# Patient Record
Sex: MALE | Race: WHITE | Employment: UNEMPLOYED | URBAN - METROPOLITAN AREA
[De-identification: names, ages, dates, MRNs, and addresses within clinical notes are randomized per-mention and may not be internally consistent; named-entity substitution may affect disease eponyms.]

---

## 2017-01-16 ENCOUNTER — HOSPITAL ENCOUNTER (OUTPATIENT)
Dept: GENERAL RADIOLOGY | Age: 37
Discharge: HOME | End: 2017-01-16
Attending: INTERNAL MEDICINE
Payer: COMMERCIAL

## 2017-01-16 ENCOUNTER — TELEPHONE (OUTPATIENT)
Dept: INTERNAL MEDICINE CLINIC | Facility: CLINIC | Age: 37
End: 2017-01-16

## 2017-01-16 ENCOUNTER — OFFICE VISIT (OUTPATIENT)
Dept: INTERNAL MEDICINE CLINIC | Facility: CLINIC | Age: 37
End: 2017-01-16

## 2017-01-16 VITALS
HEART RATE: 70 BPM | BODY MASS INDEX: 29 KG/M2 | TEMPERATURE: 99 F | OXYGEN SATURATION: 98 % | WEIGHT: 229.25 LBS | SYSTOLIC BLOOD PRESSURE: 108 MMHG | DIASTOLIC BLOOD PRESSURE: 64 MMHG

## 2017-01-16 DIAGNOSIS — R21 FACIAL RASH: ICD-10-CM

## 2017-01-16 DIAGNOSIS — H93.90 LESION OF EAR: ICD-10-CM

## 2017-01-16 DIAGNOSIS — R07.89 RIGHT-SIDED CHEST WALL PAIN: Primary | ICD-10-CM

## 2017-01-16 DIAGNOSIS — K13.0 LESION OF LIP: ICD-10-CM

## 2017-01-16 DIAGNOSIS — R07.89 RIGHT-SIDED CHEST WALL PAIN: ICD-10-CM

## 2017-01-16 PROCEDURE — 99214 OFFICE O/P EST MOD 30 MIN: CPT | Performed by: INTERNAL MEDICINE

## 2017-01-16 PROCEDURE — 71101 X-RAY EXAM UNILAT RIBS/CHEST: CPT

## 2017-01-16 RX ORDER — TRAMADOL HYDROCHLORIDE 50 MG/1
50 TABLET ORAL EVERY 6 HOURS PRN
Qty: 30 TABLET | Refills: 1 | Status: SHIPPED | OUTPATIENT
Start: 2017-01-16 | End: 2018-02-01 | Stop reason: ALTCHOICE

## 2017-01-16 NOTE — PATIENT INSTRUCTIONS
For your rib/chest wall pain:  - We will check an x-ray of your ribs  - I have prescribed pain medication (Tramadol). Take 1 tablet every six hours as needed.   It can make you drowsy, so be careful when driving/operating machinery  - We will contact you monique

## 2017-01-16 NOTE — PROGRESS NOTES
Selin Lopez is a 43year old male. HPI:   Patient presents with:  Pain: Right side rib pain from fall 1 week ago. Dryness: behind left ear over 1 month. Dryness left corner of lip. Patient presents with right-sided chest wall pain.   He was runni smoked. He does not have any smokeless tobacco history on file. He reports that he does not drink alcohol or use illicit drugs.     Wt Readings from Last 6 Encounters:  01/16/17 : 229 lb 4 oz  12/25/16 : 235 lb  02/17/16 : 233 lb  01/14/16 : 231 lb 12 oz

## 2018-02-01 ENCOUNTER — OFFICE VISIT (OUTPATIENT)
Dept: INTERNAL MEDICINE CLINIC | Facility: CLINIC | Age: 38
End: 2018-02-01

## 2018-02-01 VITALS
HEIGHT: 73.75 IN | BODY MASS INDEX: 30.61 KG/M2 | WEIGHT: 236 LBS | SYSTOLIC BLOOD PRESSURE: 118 MMHG | RESPIRATION RATE: 12 BRPM | DIASTOLIC BLOOD PRESSURE: 84 MMHG | TEMPERATURE: 98 F | OXYGEN SATURATION: 98 % | HEART RATE: 76 BPM

## 2018-02-01 DIAGNOSIS — Z00.00 LABORATORY EXAM ORDERED AS PART OF ROUTINE GENERAL MEDICAL EXAMINATION: ICD-10-CM

## 2018-02-01 DIAGNOSIS — L60.0 INGROWN NAIL: ICD-10-CM

## 2018-02-01 DIAGNOSIS — Z23 NEED FOR DIPHTHERIA-TETANUS-PERTUSSIS (TDAP) VACCINE: ICD-10-CM

## 2018-02-01 DIAGNOSIS — Z00.00 WELLNESS EXAMINATION: Primary | ICD-10-CM

## 2018-02-01 PROBLEM — G43.109 MIGRAINE WITH AURA: Status: ACTIVE | Noted: 2018-02-01

## 2018-02-01 PROCEDURE — 90471 IMMUNIZATION ADMIN: CPT | Performed by: FAMILY MEDICINE

## 2018-02-01 PROCEDURE — 99395 PREV VISIT EST AGE 18-39: CPT | Performed by: FAMILY MEDICINE

## 2018-02-01 PROCEDURE — 90715 TDAP VACCINE 7 YRS/> IM: CPT | Performed by: FAMILY MEDICINE

## 2018-02-01 RX ORDER — CEFADROXIL 500 MG/1
500 CAPSULE ORAL 2 TIMES DAILY
Qty: 14 CAPSULE | Refills: 0 | Status: SHIPPED | OUTPATIENT
Start: 2018-02-01 | End: 2018-07-02 | Stop reason: ALTCHOICE

## 2018-02-01 NOTE — PROGRESS NOTES
HPI:    Patient ID: Nadia London is a 42year old male.     HPI  Nadia London is a 42year old male who presents for a complete physical exam.   HPI:       Wt Readings from Last 6 Encounters:  02/01/18 : 236 lb  01/16/17 : 229 lb 4 oz  12/25/16 : 235 lb throat are clear  EYES:PERRLA, EOMI, conjunctiva are clear  NECK: supple,no adenopathy  CHEST: no chest tenderness  BREAST: no dominant or suspicious mass  LUNGS: clear to auscultation  CARDIO: RRR without murmur  GI: good BS's,no masses, HSM or tenderness

## 2018-07-02 ENCOUNTER — OFFICE VISIT (OUTPATIENT)
Dept: FAMILY MEDICINE CLINIC | Facility: CLINIC | Age: 38
End: 2018-07-02

## 2018-07-02 VITALS
HEIGHT: 74 IN | TEMPERATURE: 99 F | SYSTOLIC BLOOD PRESSURE: 122 MMHG | BODY MASS INDEX: 30.03 KG/M2 | WEIGHT: 234 LBS | HEART RATE: 82 BPM | DIASTOLIC BLOOD PRESSURE: 82 MMHG | RESPIRATION RATE: 16 BRPM | OXYGEN SATURATION: 98 %

## 2018-07-02 DIAGNOSIS — H65.192 ACUTE EFFUSION OF LEFT EAR: ICD-10-CM

## 2018-07-02 DIAGNOSIS — H66.002 ACUTE SUPPURATIVE OTITIS MEDIA OF LEFT EAR WITHOUT SPONTANEOUS RUPTURE OF TYMPANIC MEMBRANE, RECURRENCE NOT SPECIFIED: Primary | ICD-10-CM

## 2018-07-02 PROCEDURE — 99213 OFFICE O/P EST LOW 20 MIN: CPT | Performed by: NURSE PRACTITIONER

## 2018-07-02 RX ORDER — FLUTICASONE PROPIONATE 50 MCG
2 SPRAY, SUSPENSION (ML) NASAL DAILY
Qty: 1 BOTTLE | Refills: 0 | Status: SHIPPED | OUTPATIENT
Start: 2018-07-02 | End: 2019-06-27

## 2018-07-02 RX ORDER — AMOXICILLIN 875 MG/1
875 TABLET, COATED ORAL 2 TIMES DAILY
Qty: 20 TABLET | Refills: 0 | Status: SHIPPED | OUTPATIENT
Start: 2018-07-02 | End: 2018-07-12

## 2018-07-02 NOTE — PROGRESS NOTES
CHIEF COMPLAINT:   Patient presents with:  Ear Pain: Left ear, Since this AM.      HPI:   Gurjit Terry is a 42year old male who presents to clinic today with complaints of left ear pain. Has had for 1  days. Pain is described as mild.   Patient denies EARS: Tragus non tender on palpation bilaterally. External auditory canals healthy. Right TM: clear, opaque, no bulging, no retraction, small effusion, bony landmarks visible.   Left TM: mildly erythemic, vascular markings noted at 11-2oclock, no bulging, s You have an infection of the middle ear, the space behind the eardrum. This is also called acute otitis media (AOM). Sometimes it is caused by the common cold.  This is because congestion can block the internal passage (eustachian tube) that drains fluid fr

## 2018-07-14 ENCOUNTER — OFFICE VISIT (OUTPATIENT)
Dept: FAMILY MEDICINE CLINIC | Facility: CLINIC | Age: 38
End: 2018-07-14

## 2018-07-14 VITALS
HEART RATE: 68 BPM | BODY MASS INDEX: 29.9 KG/M2 | RESPIRATION RATE: 14 BRPM | TEMPERATURE: 99 F | SYSTOLIC BLOOD PRESSURE: 106 MMHG | OXYGEN SATURATION: 97 % | DIASTOLIC BLOOD PRESSURE: 72 MMHG | HEIGHT: 74 IN | WEIGHT: 233 LBS

## 2018-07-14 DIAGNOSIS — H66.001 ACUTE SUPPURATIVE OTITIS MEDIA OF RIGHT EAR WITHOUT SPONTANEOUS RUPTURE OF TYMPANIC MEMBRANE, RECURRENCE NOT SPECIFIED: Primary | ICD-10-CM

## 2018-07-14 DIAGNOSIS — H69.83 DYSFUNCTION OF BOTH EUSTACHIAN TUBES: ICD-10-CM

## 2018-07-14 PROCEDURE — 99213 OFFICE O/P EST LOW 20 MIN: CPT | Performed by: NURSE PRACTITIONER

## 2018-07-14 RX ORDER — CEFDINIR 300 MG/1
300 CAPSULE ORAL 2 TIMES DAILY
Qty: 20 CAPSULE | Refills: 0 | Status: SHIPPED | OUTPATIENT
Start: 2018-07-14 | End: 2018-07-24

## 2018-07-14 NOTE — PROGRESS NOTES
CHIEF COMPLAINT:   Patient presents with:  Ear Pain: RT ear x2 days      HPI:   Rafa Pugh is a 42year old male who presents to clinic today with complaints of right ear pain. Has had for 2  days. Pain is described as sharp, intermittent.   Olga EARS: Tragus non tender on palpation bilaterally. External auditory canals healthy. Right TM: erythemic,  bulging, no retraction,large effusion, bony landmarks obscured.   Left TM: erythemic, no obvious bulging, no retraction, mild clear effusion, bony land You have an infection of the middle ear, the space behind the eardrum. This is also called acute otitis media (AOM). Sometimes it is caused by the common cold.  This is because congestion can block the internal passage (eustachian tube) that drains fluid fr

## 2019-12-11 ENCOUNTER — HOSPITAL ENCOUNTER (OUTPATIENT)
Dept: GENERAL RADIOLOGY | Age: 39
Discharge: HOME | End: 2019-12-11
Attending: FAMILY MEDICINE
Payer: COMMERCIAL

## 2019-12-11 ENCOUNTER — OFFICE VISIT (OUTPATIENT)
Dept: INTERNAL MEDICINE CLINIC | Facility: CLINIC | Age: 39
End: 2019-12-11

## 2019-12-11 VITALS
WEIGHT: 225 LBS | TEMPERATURE: 98 F | OXYGEN SATURATION: 97 % | HEART RATE: 61 BPM | SYSTOLIC BLOOD PRESSURE: 116 MMHG | HEIGHT: 74 IN | DIASTOLIC BLOOD PRESSURE: 80 MMHG | BODY MASS INDEX: 28.88 KG/M2

## 2019-12-11 DIAGNOSIS — M79.671 RIGHT FOOT PAIN: Primary | ICD-10-CM

## 2019-12-11 DIAGNOSIS — M79.671 RIGHT FOOT PAIN: ICD-10-CM

## 2019-12-11 DIAGNOSIS — M25.562 ACUTE PAIN OF LEFT KNEE: ICD-10-CM

## 2019-12-11 PROCEDURE — 73560 X-RAY EXAM OF KNEE 1 OR 2: CPT | Performed by: FAMILY MEDICINE

## 2019-12-11 PROCEDURE — 99213 OFFICE O/P EST LOW 20 MIN: CPT | Performed by: FAMILY MEDICINE

## 2019-12-11 PROCEDURE — 73630 X-RAY EXAM OF FOOT: CPT | Performed by: FAMILY MEDICINE

## 2019-12-11 RX ORDER — MELOXICAM 15 MG/1
15 TABLET ORAL DAILY
Qty: 30 TABLET | Refills: 1 | Status: SHIPPED | OUTPATIENT
Start: 2019-12-11 | End: 2021-08-26

## 2019-12-11 NOTE — PROGRESS NOTES
Patient presents with: Foot Pain: R foot pain since the summer   Knee Pain: L knee pain/on the side     HPI:   Kamryn Martinez is a 37year old male present with complain of right foot and left knee pain.   Onset: Right Foot- Pt injured foot this summer jump nourished,in no apparent distress  SKIN: no rashes,no suspicious lesions  NECK: supple,no adenopathy  LUNGS: clear to auscultation  CARDIO: RRR without murmur  EXTREMITIES:   Right Foot  exam:   - defect/deformity : none  - swelling/effusion: none  - bruis

## 2019-12-23 DIAGNOSIS — M79.671 RIGHT FOOT PAIN: Primary | ICD-10-CM

## 2020-05-29 ENCOUNTER — HOSPITAL ENCOUNTER (OUTPATIENT)
Age: 40
Discharge: HOME | End: 2020-05-29
Attending: EMERGENCY MEDICINE
Payer: COMMERCIAL

## 2020-05-29 VITALS
HEART RATE: 68 BPM | DIASTOLIC BLOOD PRESSURE: 62 MMHG | RESPIRATION RATE: 20 BRPM | TEMPERATURE: 98 F | OXYGEN SATURATION: 97 % | SYSTOLIC BLOOD PRESSURE: 111 MMHG

## 2020-05-29 DIAGNOSIS — S61.219A FINGER LACERATION, INITIAL ENCOUNTER: Primary | ICD-10-CM

## 2020-05-29 PROCEDURE — 99213 OFFICE O/P EST LOW 20 MIN: CPT

## 2020-05-29 PROCEDURE — 99203 OFFICE O/P NEW LOW 30 MIN: CPT

## 2020-05-29 NOTE — ED PROVIDER NOTES
Patient Seen in: [facility]      History   Patient presents with:  Laceration    Stated Complaint: Deep cut/gouge in right pointer finger    HPI  61-year-old male presents to the immediate care for complaints of a right finger l and evaluated during this visit. As a treating physician attending to the patient, I determined, within reasonable clinical confidence and prior to discharge, that an emergency medical condition was not or was no longer present.   There was no indication

## 2021-04-26 ENCOUNTER — IMMUNIZATION (OUTPATIENT)
Dept: LAB | Facility: HOSPITAL | Age: 41
End: 2021-04-26
Attending: EMERGENCY MEDICINE
Payer: COMMERCIAL

## 2021-04-26 DIAGNOSIS — Z23 NEED FOR VACCINATION: Primary | ICD-10-CM

## 2021-04-26 PROCEDURE — 0011A SARSCOV2 VAC 100MCG/0.5ML IM: CPT

## 2021-05-24 ENCOUNTER — IMMUNIZATION (OUTPATIENT)
Dept: LAB | Facility: HOSPITAL | Age: 41
End: 2021-05-24
Attending: EMERGENCY MEDICINE
Payer: COMMERCIAL

## 2021-05-24 DIAGNOSIS — Z23 NEED FOR VACCINATION: Primary | ICD-10-CM

## 2021-05-24 PROCEDURE — 0012A SARSCOV2 VAC 100MCG/0.5ML IM: CPT

## 2021-08-26 ENCOUNTER — OFFICE VISIT (OUTPATIENT)
Dept: INTERNAL MEDICINE CLINIC | Facility: CLINIC | Age: 41
End: 2021-08-26

## 2021-08-26 ENCOUNTER — HOSPITAL ENCOUNTER (OUTPATIENT)
Dept: GENERAL RADIOLOGY | Age: 41
Discharge: HOME | End: 2021-08-26
Attending: FAMILY MEDICINE
Payer: COMMERCIAL

## 2021-08-26 VITALS
HEIGHT: 73.5 IN | BODY MASS INDEX: 26.41 KG/M2 | RESPIRATION RATE: 12 BRPM | OXYGEN SATURATION: 98 % | DIASTOLIC BLOOD PRESSURE: 60 MMHG | TEMPERATURE: 99 F | WEIGHT: 203.63 LBS | SYSTOLIC BLOOD PRESSURE: 110 MMHG | HEART RATE: 68 BPM

## 2021-08-26 DIAGNOSIS — Z00.00 LABORATORY EXAM ORDERED AS PART OF ROUTINE GENERAL MEDICAL EXAMINATION: ICD-10-CM

## 2021-08-26 DIAGNOSIS — K40.90 LEFT INGUINAL HERNIA: ICD-10-CM

## 2021-08-26 DIAGNOSIS — R10.9 LEFT FLANK PAIN: ICD-10-CM

## 2021-08-26 DIAGNOSIS — Z00.00 WELLNESS EXAMINATION: Primary | ICD-10-CM

## 2021-08-26 PROCEDURE — 74018 RADEX ABDOMEN 1 VIEW: CPT | Performed by: FAMILY MEDICINE

## 2021-08-26 PROCEDURE — 3074F SYST BP LT 130 MM HG: CPT | Performed by: FAMILY MEDICINE

## 2021-08-26 PROCEDURE — 3008F BODY MASS INDEX DOCD: CPT | Performed by: FAMILY MEDICINE

## 2021-08-26 PROCEDURE — 99396 PREV VISIT EST AGE 40-64: CPT | Performed by: FAMILY MEDICINE

## 2021-08-26 PROCEDURE — 99213 OFFICE O/P EST LOW 20 MIN: CPT | Performed by: FAMILY MEDICINE

## 2021-08-26 PROCEDURE — 3078F DIAST BP <80 MM HG: CPT | Performed by: FAMILY MEDICINE

## 2021-08-26 NOTE — PROGRESS NOTES
Lisa Alvarez is a 41year old male who presents for a complete physical exam.   HPI:       Wt Readings from Last 6 Encounters:  08/26/21 : 203 lb 9.6 oz (92.4 kg)  12/11/19 : 225 lb (102.1 kg)  07/14/18 : 233 lb (105.7 kg)  07/02/18 : 234 lb (106.1 kg)  0 no apparent distress  SKIN: no rashes,  NECK: supple,no adenopathy  LUNGS: clear to auscultation  CARDIO: RRR without murmur  GI: good BS's,no masses, HSM or tenderness  No CVAT  : two descended testes,no masses,+ Left Ing hernia,no penile lesions  EXTRE

## 2021-09-01 ENCOUNTER — LAB ENCOUNTER (OUTPATIENT)
Dept: LAB | Age: 41
End: 2021-09-01
Attending: FAMILY MEDICINE
Payer: COMMERCIAL

## 2021-09-01 LAB
ALBUMIN SERPL-MCNC: 3.8 G/DL (ref 3.4–5)
ALBUMIN/GLOB SERPL: 1.1 {RATIO} (ref 1–2)
ALP LIVER SERPL-CCNC: 64 U/L
ALT SERPL-CCNC: 22 U/L
ANION GAP SERPL CALC-SCNC: 7 MMOL/L (ref 0–18)
AST SERPL-CCNC: 14 U/L (ref 15–37)
BASOPHILS # BLD AUTO: 0.04 X10(3) UL (ref 0–0.2)
BASOPHILS NFR BLD AUTO: 0.7 %
BILIRUB SERPL-MCNC: 0.6 MG/DL (ref 0.1–2)
BILIRUB UR QL STRIP.AUTO: NEGATIVE
BUN BLD-MCNC: 15 MG/DL (ref 7–18)
CALCIUM BLD-MCNC: 8.7 MG/DL (ref 8.5–10.1)
CHLORIDE SERPL-SCNC: 106 MMOL/L (ref 98–112)
CHOLEST SMN-MCNC: 182 MG/DL (ref ?–200)
CLARITY UR REFRACT.AUTO: CLEAR
CO2 SERPL-SCNC: 28 MMOL/L (ref 21–32)
COLOR UR AUTO: YELLOW
CREAT BLD-MCNC: 0.85 MG/DL
EOSINOPHIL # BLD AUTO: 0.19 X10(3) UL (ref 0–0.7)
EOSINOPHIL NFR BLD AUTO: 3.4 %
ERYTHROCYTE [DISTWIDTH] IN BLOOD BY AUTOMATED COUNT: 12 %
GLOBULIN PLAS-MCNC: 3.4 G/DL (ref 2.8–4.4)
GLUCOSE BLD-MCNC: 86 MG/DL (ref 70–99)
GLUCOSE UR STRIP.AUTO-MCNC: NEGATIVE MG/DL
HCT VFR BLD AUTO: 48.3 %
HDLC SERPL-MCNC: 43 MG/DL (ref 40–59)
HGB BLD-MCNC: 17.1 G/DL
IMM GRANULOCYTES # BLD AUTO: 0.03 X10(3) UL (ref 0–1)
IMM GRANULOCYTES NFR BLD: 0.5 %
KETONES UR STRIP.AUTO-MCNC: NEGATIVE MG/DL
LDLC SERPL CALC-MCNC: 122 MG/DL (ref ?–100)
LEUKOCYTE ESTERASE UR QL STRIP.AUTO: NEGATIVE
LYMPHOCYTES # BLD AUTO: 2.24 X10(3) UL (ref 1–4)
LYMPHOCYTES NFR BLD AUTO: 40.4 %
M PROTEIN MFR SERPL ELPH: 7.2 G/DL (ref 6.4–8.2)
MCH RBC QN AUTO: 30.5 PG (ref 26–34)
MCHC RBC AUTO-ENTMCNC: 35.4 G/DL (ref 31–37)
MCV RBC AUTO: 86.3 FL
MONOCYTES # BLD AUTO: 0.52 X10(3) UL (ref 0.1–1)
MONOCYTES NFR BLD AUTO: 9.4 %
NEUTROPHILS # BLD AUTO: 2.53 X10 (3) UL (ref 1.5–7.7)
NEUTROPHILS # BLD AUTO: 2.53 X10(3) UL (ref 1.5–7.7)
NEUTROPHILS NFR BLD AUTO: 45.6 %
NITRITE UR QL STRIP.AUTO: NEGATIVE
NONHDLC SERPL-MCNC: 139 MG/DL (ref ?–130)
OSMOLALITY SERPL CALC.SUM OF ELEC: 292 MOSM/KG (ref 275–295)
PATIENT FASTING Y/N/NP: YES
PATIENT FASTING Y/N/NP: YES
PH UR STRIP.AUTO: 7 [PH] (ref 5–8)
PLATELET # BLD AUTO: 219 10(3)UL (ref 150–450)
POTASSIUM SERPL-SCNC: 3.9 MMOL/L (ref 3.5–5.1)
PROT UR STRIP.AUTO-MCNC: NEGATIVE MG/DL
RBC # BLD AUTO: 5.6 X10(6)UL
RBC UR QL AUTO: NEGATIVE
SODIUM SERPL-SCNC: 141 MMOL/L (ref 136–145)
SP GR UR STRIP.AUTO: 1.02 (ref 1–1.03)
TRIGL SERPL-MCNC: 91 MG/DL (ref 30–149)
UROBILINOGEN UR STRIP.AUTO-MCNC: <2 MG/DL
VLDLC SERPL CALC-MCNC: 16 MG/DL (ref 0–30)
WBC # BLD AUTO: 5.6 X10(3) UL (ref 4–11)

## 2021-09-01 PROCEDURE — 81003 URINALYSIS AUTO W/O SCOPE: CPT | Performed by: FAMILY MEDICINE

## 2021-09-01 PROCEDURE — 85025 COMPLETE CBC W/AUTO DIFF WBC: CPT | Performed by: FAMILY MEDICINE

## 2021-09-01 PROCEDURE — 36415 COLL VENOUS BLD VENIPUNCTURE: CPT | Performed by: FAMILY MEDICINE

## 2021-09-01 PROCEDURE — 80061 LIPID PANEL: CPT | Performed by: FAMILY MEDICINE

## 2021-09-01 PROCEDURE — 80053 COMPREHEN METABOLIC PANEL: CPT | Performed by: FAMILY MEDICINE

## 2023-04-27 ENCOUNTER — OFFICE VISIT (OUTPATIENT)
Dept: INTERNAL MEDICINE CLINIC | Facility: CLINIC | Age: 43
End: 2023-04-27
Payer: COMMERCIAL

## 2023-04-27 ENCOUNTER — HOSPITAL ENCOUNTER (OUTPATIENT)
Dept: GENERAL RADIOLOGY | Age: 43
Discharge: HOME | End: 2023-04-27
Attending: FAMILY MEDICINE
Payer: COMMERCIAL

## 2023-04-27 VITALS
WEIGHT: 204.19 LBS | OXYGEN SATURATION: 96 % | TEMPERATURE: 98 F | BODY MASS INDEX: 27.66 KG/M2 | HEART RATE: 74 BPM | SYSTOLIC BLOOD PRESSURE: 126 MMHG | DIASTOLIC BLOOD PRESSURE: 76 MMHG | HEIGHT: 72 IN

## 2023-04-27 DIAGNOSIS — M25.512 ACUTE PAIN OF LEFT SHOULDER: ICD-10-CM

## 2023-04-27 DIAGNOSIS — S06.0XAA CONCUSSION WITH UNKNOWN LOSS OF CONSCIOUSNESS STATUS, INITIAL ENCOUNTER: Primary | ICD-10-CM

## 2023-04-27 PROCEDURE — 73030 X-RAY EXAM OF SHOULDER: CPT | Performed by: FAMILY MEDICINE

## 2023-04-27 PROCEDURE — 99214 OFFICE O/P EST MOD 30 MIN: CPT | Performed by: FAMILY MEDICINE

## 2023-04-27 PROCEDURE — 3074F SYST BP LT 130 MM HG: CPT | Performed by: FAMILY MEDICINE

## 2023-04-27 PROCEDURE — 3008F BODY MASS INDEX DOCD: CPT | Performed by: FAMILY MEDICINE

## 2023-04-27 PROCEDURE — 3078F DIAST BP <80 MM HG: CPT | Performed by: FAMILY MEDICINE

## 2025-03-07 ENCOUNTER — LAB ENCOUNTER (OUTPATIENT)
Dept: LAB | Age: 45
End: 2025-03-07
Attending: FAMILY MEDICINE
Payer: COMMERCIAL

## 2025-03-07 ENCOUNTER — OFFICE VISIT (OUTPATIENT)
Dept: INTERNAL MEDICINE CLINIC | Facility: CLINIC | Age: 45
End: 2025-03-07
Payer: COMMERCIAL

## 2025-03-07 VITALS
BODY MASS INDEX: 28.82 KG/M2 | OXYGEN SATURATION: 98 % | HEIGHT: 72 IN | SYSTOLIC BLOOD PRESSURE: 112 MMHG | HEART RATE: 79 BPM | TEMPERATURE: 99 F | WEIGHT: 212.81 LBS | DIASTOLIC BLOOD PRESSURE: 74 MMHG

## 2025-03-07 DIAGNOSIS — Z00.00 LABORATORY EXAM ORDERED AS PART OF ROUTINE GENERAL MEDICAL EXAMINATION: ICD-10-CM

## 2025-03-07 DIAGNOSIS — Z12.11 COLON CANCER SCREENING: ICD-10-CM

## 2025-03-07 DIAGNOSIS — L72.9 CYST OF SCROTUM: ICD-10-CM

## 2025-03-07 DIAGNOSIS — Z00.00 WELLNESS EXAMINATION: Primary | ICD-10-CM

## 2025-03-07 DIAGNOSIS — R06.83 SNORES: ICD-10-CM

## 2025-03-07 LAB — HEMOCCULT STL QL: NEGATIVE

## 2025-03-07 PROCEDURE — 82274 ASSAY TEST FOR BLOOD FECAL: CPT | Performed by: FAMILY MEDICINE

## 2025-03-07 NOTE — PROGRESS NOTES
Lauro Gross is a 44 year old male who presents for a complete physical exam.   HPI:       Wt Readings from Last 6 Encounters:   03/07/25 212 lb 12.8 oz (96.5 kg)   04/27/23 204 lb 3.2 oz (92.6 kg)   08/26/21 203 lb 9.6 oz (92.4 kg)   12/11/19 225 lb (102.1 kg)   07/14/18 233 lb (105.7 kg)   07/02/18 234 lb (106.1 kg)     Body mass index is 28.86 kg/m².     Cholesterol, Total (mg/dL)   Date Value   09/01/2021 182     HDL Cholesterol (mg/dL)   Date Value   09/01/2021 43     LDL Cholesterol (mg/dL)   Date Value   09/01/2021 122 (H)     AST (U/L)   Date Value   09/01/2021 14 (L)     ALT (U/L)   Date Value   09/01/2021 22      No current outpatient medications on file.      History reviewed. No pertinent past medical history.   History reviewed. No pertinent surgical history.   Family History   Problem Relation Age of Onset    Heart Attack Father       Social History:  Social History     Socioeconomic History    Marital status:    Tobacco Use    Smoking status: Never    Smokeless tobacco: Never   Vaping Use    Vaping status: Never Used   Substance and Sexual Activity    Alcohol use: Yes     Comment: occ    Drug use: Yes     Types: Cannabis     Comment: \"once or twice per month\"   Other Topics Concern    Caffeine Concern Yes     Comment: 1 can of Diet Mountain Dew daily.    Exercise Yes     Comment: 3x/week     Social Drivers of Health     Food Insecurity: No Food Insecurity (3/7/2025)    NCSS - Food Insecurity     Worried About Running Out of Food in the Last Year: No     Ran Out of Food in the Last Year: No   Transportation Needs: No Transportation Needs (3/7/2025)    NCSS - Transportation     Lack of Transportation: No   Housing Stability: Not At Risk (3/7/2025)    NCSS - Housing/Utilities     Has Housing: Yes     Worried About Losing Housing: No     Unable to Get Utilities: No      .        REVIEW OF SYSTEMS:   GENERAL: feels well otherwise  SKIN: denies rash  HEENT: denies nasal congestion, sinus pain or  ST  LUNGS: denies shortness of breath  thinks may have sleep apnea   snores loud  per wife    stop breathing   tired in the AM     rare dozing off    CARDIOVASCULAR: denies chest pain on exertion  GI: denies abdominal pain  : denies dysuria    2 weeks ago felt on the R testicle   NT  no injury     no blood in urine     NEURO: denies headaches  PSYCHE: denies depression or anxiety  HEMATOLOGIC: denies hx of anemia  ENDOCRINE: denies thyroid history  ALL/ASTHMA: denies hx of allergy or asthma    EXAM:   /74 (BP Location: Left arm, Patient Position: Sitting, Cuff Size: large)   Pulse 79   Temp 98.8 °F (37.1 °C) (Temporal)   Ht 6' (1.829 m)   Wt 212 lb 12.8 oz (96.5 kg)   SpO2 98%   BMI 28.86 kg/m²   Body mass index is 28.86 kg/m².   GENERAL: well developed, well nourished,in no apparent distress  SKIN: no rashes,  HEENT: atraumatic, normocephalic,ears and throat are clear  NECK: supple,no adenopathy  LUNGS: clear to auscultation  CARDIO: RRR without murmur  GI: good BS's,no masses, HSM or tenderness  : two descended testes,no masses,no hernia,no penile lesions  2mm mobile NT lump above the R testicle , not on it    RECTAL: good rectal tone, prostate shows no masses, FIT done    EXTREMITIES: no edema  NEURO: motor and sensory are grossly intact    ASSESSMENT AND PLAN:   Lauro Gross is a 44 year old male who presents for a complete physical exam.  Health maintenance, will check fasting Lipids, CMP, CBC and UA.  FIT done    Dr Eli for cscope   discussed snoring    check sleep study    discussed the lump   feels like a cyst    check US     discussed weight gain    The patient indicates understanding of these issues and agrees to the plan.  The patient is asked to return for CPX in 1yr.

## 2025-03-18 LAB
ABSOLUTE BASOPHILS: 51 CELLS/UL (ref 0–200)
ABSOLUTE EOSINOPHILS: 291 CELLS/UL (ref 15–500)
ABSOLUTE LYMPHOCYTES: 2554 CELLS/UL (ref 850–3900)
ABSOLUTE MONOCYTES: 530 CELLS/UL (ref 200–950)
ABSOLUTE NEUTROPHILS: 2274 CELLS/UL (ref 1500–7800)
ALBUMIN/GLOBULIN RATIO: 1.7 (CALC) (ref 1–2.5)
ALBUMIN: 4.3 G/DL (ref 3.6–5.1)
ALKALINE PHOSPHATASE: 59 U/L (ref 36–130)
ALT: 33 U/L (ref 9–46)
APPEARANCE: CLEAR
AST: 21 U/L (ref 10–40)
BASOPHILS: 0.9 %
BILIRUBIN, TOTAL: 0.6 MG/DL (ref 0.2–1.2)
BILIRUBIN: NEGATIVE
BUN: 17 MG/DL (ref 7–25)
CALCIUM: 9.5 MG/DL (ref 8.6–10.3)
CARBON DIOXIDE: 31 MMOL/L (ref 20–32)
CHLORIDE: 102 MMOL/L (ref 98–110)
CHOL/HDLC RATIO: 4.2 (CALC)
CHOLESTEROL, TOTAL: 212 MG/DL
COLOR: YELLOW
CREATININE: 0.98 MG/DL (ref 0.6–1.29)
EGFR: 96 ML/MIN/1.73M2
EOSINOPHILS: 5.1 %
GLOBULIN: 2.6 G/DL (CALC) (ref 1.9–3.7)
GLUCOSE: 88 MG/DL (ref 65–99)
GLUCOSE: NEGATIVE
HDL CHOLESTEROL: 51 MG/DL
HEMATOCRIT: 49.2 % (ref 38.5–50)
HEMOGLOBIN: 17 G/DL (ref 13.2–17.1)
KETONES: NEGATIVE
LDL-CHOLESTEROL: 133 MG/DL (CALC)
LEUKOCYTE ESTERASE: NEGATIVE
LYMPHOCYTES: 44.8 %
MCH: 30.5 PG (ref 27–33)
MCHC: 34.6 G/DL (ref 32–36)
MCV: 88.3 FL (ref 80–100)
MONOCYTES: 9.3 %
MPV: 9.5 FL (ref 7.5–12.5)
NEUTROPHILS: 39.9 %
NITRITE: NEGATIVE
NON-HDL CHOLESTEROL: 161 MG/DL (CALC)
PH: 6 (ref 5–8)
PLATELET COUNT: 216 THOUSAND/UL (ref 140–400)
POTASSIUM: 4 MMOL/L (ref 3.5–5.3)
PROTEIN, TOTAL: 6.9 G/DL (ref 6.1–8.1)
PROTEIN: NEGATIVE
RDW: 12.8 % (ref 11–15)
RED BLOOD CELL COUNT: 5.57 MILLION/UL (ref 4.2–5.8)
SODIUM: 140 MMOL/L (ref 135–146)
SPECIFIC GRAVITY: 1.02 (ref 1–1.03)
TRIGLYCERIDES: 153 MG/DL
TSH: 1.04 MIU/L (ref 0.4–4.5)
WHITE BLOOD CELL COUNT: 5.7 THOUSAND/UL (ref 3.8–10.8)

## 2025-04-08 ENCOUNTER — TELEPHONE (OUTPATIENT)
Dept: INTERNAL MEDICINE CLINIC | Facility: CLINIC | Age: 45
End: 2025-04-08

## (undated) NOTE — LETTER
07/27/18        [id number] [address]      Dear Camron Members,    [id number] records indicate that you have outstanding lab work and or testing that was ordered for you and has not yet been completed:          CBC W/DIFF      COMP METABOL

## (undated) NOTE — LETTER
4/8/2025    [facility]uro Christl  [address] [facility] [facility] [address]         Dear Lauro,    This letter is to inform you that our office has made several attempts to reach you by phone without success.  We were attempting to contact you by phone regarding test results.    Please contact our office at the number listed below as soon as you receive this letter to discuss this issue and to make the necessary changes in our system to your contact information.  Thank you for your cooperation.        Sincerely,    Augustus Valentino MD  [address] [address]  Ph: [phone]  Fax: [phone]         Document electronically generated by:  Maggi SIMON RN

## (undated) NOTE — MR AVS SNAPSHOT
[facility]  [address] [facility] [address]  [address] [facility] [address] [phone]               Thank you for choosing us for your health care visit with Torsten Vora MD.  We are glad to serve you and happy to provide you with this s Take 1 tablet (50 mg total) by mouth every 6 (six) hours as needed for Pain.    Commonly known as:  ULTRAM                Where to Get Your Medications      You can get these medications from any pharmacy     Bring a paper prescription for each of these med authorization numbers or be assured that none are required. You can then schedule your appointment. Failure to obtain required authorization numbers can create reimbursement difficulties for you. Assoc Dx:   Lesion of lip [K13.0], Lesion of ear [H61.899] Start activities slowly and build up over time Do what you like   Get your heart pumping – brisk walking, biking, swimming Even 10 minute increments are effective and add up over the week   2 ½ hours per week – spread out over time Use a thomas to keep you